# Patient Record
Sex: FEMALE | Race: WHITE | Employment: STUDENT | URBAN - METROPOLITAN AREA
[De-identification: names, ages, dates, MRNs, and addresses within clinical notes are randomized per-mention and may not be internally consistent; named-entity substitution may affect disease eponyms.]

---
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
## 2022-02-04 PROBLEM — N94.0 MITTELSCHMERZ: Status: ACTIVE | Noted: 2021-09-22

## 2022-02-04 PROBLEM — R10.31 RLQ ABDOMINAL PAIN: Status: ACTIVE | Noted: 2021-09-22

## 2022-06-25 ENCOUNTER — OFFICE VISIT (OUTPATIENT)
Dept: FAMILY MEDICINE CLINIC | Facility: CLINIC | Age: 22
End: 2022-06-25
Payer: COMMERCIAL

## 2022-06-25 VITALS
OXYGEN SATURATION: 98 % | HEIGHT: 62 IN | WEIGHT: 150 LBS | RESPIRATION RATE: 18 BRPM | HEART RATE: 111 BPM | SYSTOLIC BLOOD PRESSURE: 110 MMHG | TEMPERATURE: 100 F | BODY MASS INDEX: 27.6 KG/M2 | DIASTOLIC BLOOD PRESSURE: 64 MMHG

## 2022-06-25 DIAGNOSIS — R68.89 FLU-LIKE SYMPTOMS: ICD-10-CM

## 2022-06-25 DIAGNOSIS — R11.0 NAUSEA: ICD-10-CM

## 2022-06-25 DIAGNOSIS — J02.9 SORE THROAT: Primary | ICD-10-CM

## 2022-06-25 LAB
CONTROL LINE PRESENT WITH A CLEAR BACKGROUND (YES/NO): YES YES/NO
STREP GRP A CUL-SCR: NEGATIVE

## 2022-06-25 PROCEDURE — 3008F BODY MASS INDEX DOCD: CPT | Performed by: PHYSICIAN ASSISTANT

## 2022-06-25 PROCEDURE — 99213 OFFICE O/P EST LOW 20 MIN: CPT | Performed by: PHYSICIAN ASSISTANT

## 2022-06-25 PROCEDURE — 3074F SYST BP LT 130 MM HG: CPT | Performed by: PHYSICIAN ASSISTANT

## 2022-06-25 PROCEDURE — 87637 SARSCOV2&INF A&B&RSV AMP PRB: CPT | Performed by: PHYSICIAN ASSISTANT

## 2022-06-25 PROCEDURE — 3078F DIAST BP <80 MM HG: CPT | Performed by: PHYSICIAN ASSISTANT

## 2022-06-25 PROCEDURE — 87880 STREP A ASSAY W/OPTIC: CPT | Performed by: PHYSICIAN ASSISTANT

## 2022-06-25 RX ORDER — ONDANSETRON 4 MG/1
4 TABLET, ORALLY DISINTEGRATING ORAL EVERY 8 HOURS PRN
Qty: 15 TABLET | Refills: 0 | Status: SHIPPED | OUTPATIENT
Start: 2022-06-25

## 2022-06-26 LAB
FLUAV + FLUBV RNA SPEC NAA+PROBE: NEGATIVE
FLUAV + FLUBV RNA SPEC NAA+PROBE: NEGATIVE
RSV RNA SPEC NAA+PROBE: NEGATIVE
SARS-COV-2 RNA RESP QL NAA+PROBE: DETECTED

## 2022-08-28 ENCOUNTER — OFFICE VISIT (OUTPATIENT)
Dept: FAMILY MEDICINE CLINIC | Facility: CLINIC | Age: 22
End: 2022-08-28
Payer: COMMERCIAL

## 2022-08-28 VITALS
OXYGEN SATURATION: 98 % | DIASTOLIC BLOOD PRESSURE: 71 MMHG | HEIGHT: 62 IN | WEIGHT: 150 LBS | SYSTOLIC BLOOD PRESSURE: 115 MMHG | HEART RATE: 87 BPM | BODY MASS INDEX: 27.6 KG/M2 | TEMPERATURE: 98 F | RESPIRATION RATE: 18 BRPM

## 2022-08-28 DIAGNOSIS — J01.00 ACUTE NON-RECURRENT MAXILLARY SINUSITIS: Primary | ICD-10-CM

## 2022-08-28 PROCEDURE — 3008F BODY MASS INDEX DOCD: CPT | Performed by: NURSE PRACTITIONER

## 2022-08-28 PROCEDURE — 3078F DIAST BP <80 MM HG: CPT | Performed by: NURSE PRACTITIONER

## 2022-08-28 PROCEDURE — 99213 OFFICE O/P EST LOW 20 MIN: CPT | Performed by: NURSE PRACTITIONER

## 2022-08-28 PROCEDURE — 3074F SYST BP LT 130 MM HG: CPT | Performed by: NURSE PRACTITIONER

## 2022-08-28 RX ORDER — AZITHROMYCIN 250 MG/1
TABLET, FILM COATED ORAL
Qty: 6 TABLET | Refills: 0 | Status: SHIPPED | OUTPATIENT
Start: 2022-08-28 | End: 2022-09-02

## 2022-12-29 ENCOUNTER — OFFICE VISIT (OUTPATIENT)
Dept: FAMILY MEDICINE CLINIC | Facility: CLINIC | Age: 22
End: 2022-12-29
Payer: COMMERCIAL

## 2022-12-29 VITALS
HEART RATE: 71 BPM | SYSTOLIC BLOOD PRESSURE: 114 MMHG | TEMPERATURE: 98 F | DIASTOLIC BLOOD PRESSURE: 70 MMHG | HEIGHT: 62 IN | WEIGHT: 150 LBS | RESPIRATION RATE: 16 BRPM | OXYGEN SATURATION: 98 % | BODY MASS INDEX: 27.6 KG/M2

## 2022-12-29 DIAGNOSIS — H92.01 EAR PAIN, RIGHT: Primary | ICD-10-CM

## 2022-12-29 DIAGNOSIS — J06.9 UPPER RESPIRATORY TRACT INFECTION, UNSPECIFIED TYPE: ICD-10-CM

## 2022-12-29 PROCEDURE — 99213 OFFICE O/P EST LOW 20 MIN: CPT | Performed by: NURSE PRACTITIONER

## 2022-12-29 PROCEDURE — 3008F BODY MASS INDEX DOCD: CPT | Performed by: NURSE PRACTITIONER

## 2022-12-29 PROCEDURE — 3078F DIAST BP <80 MM HG: CPT | Performed by: NURSE PRACTITIONER

## 2022-12-29 PROCEDURE — 3074F SYST BP LT 130 MM HG: CPT | Performed by: NURSE PRACTITIONER

## 2022-12-29 RX ORDER — CEFDINIR 300 MG/1
300 CAPSULE ORAL 2 TIMES DAILY
Qty: 20 CAPSULE | Refills: 0 | Status: SHIPPED | OUTPATIENT
Start: 2022-12-29 | End: 2023-01-08

## 2022-12-29 NOTE — PATIENT INSTRUCTIONS
Start Sudafed 12 hour tablets twice daily for the next several days. If not helping ear pain or ear pain worsens may start Cefdinir twice daily for 10 days. Take with food. Rest, hydrate, use hot steam, tayo's vapor rub to chest, tylenol or Motrin as needed  Follow up with PCP if symptoms persist or seek emergency care if symptoms worsen.

## (undated) NOTE — ED AVS SNAPSHOT
[facility] Emergency Department    [place]  [address]    [address] [place] [address]    Phone:  [phone]    Fax:  [phone] [facility]   MRN: [id number]    Department:  [facility] Emergency Department   Date of Visit:  5/25/20 METAMUCIL Wafr   Quantity:  24 Wafer   Take 2 Wafers by mouth daily. Polyethylene Glycol 3350 Powd   Quantity:  510 g   Commonly known as:  MIRALAX   Take 17 g by mouth daily.             Where to Get Your Medications      You can get these medicatio receive this, we would really appreciate it if you could take the time to complete it. Thank you! You were examined and treated today on an urgent basis only. This was not a substitute for ongoing medical care.  Often, one Emergency Department visit d 4[address]  [facility] ([address]) [facility] [address] ([address]) [address]  [place] ([address]) 21  [address] ([address]) 972 abdominal pain since this morning with nausea. 25 [facility] (CPT=93975)+(EDW [id number]) (Final result) Result time:  05/25/17 14:42:51    Final result    Impression:    CONCLUSION:       1. Unremarkable uterus and ovaries.      2. No e COMPARISON:  None. INDICATIONS:  Right lower quadrant pain     TECHNIQUE:  A routine ultrasound of the appendix was performed. FINDINGS:  The appendix is not visualized.  Appendicitis cannot be excluded on the basis of this exam. Clinical correlatio

## (undated) NOTE — ED AVS SNAPSHOT
[facility] Emergency Department    [place]  [address]    [address] [place] [address]    Phone:  [phone]    Fax:  [phone]   MRN: [id number]    Department:  [facility] Emergency Department   Date of Visit:  5/25/20 IF THERE IS ANY CHANGE OR WORSENING OF YOUR CONDITION, CALL YOUR PRIMARY CARE PHYSICIAN AT ONCE OR RETURN IMMEDIATELY TO THE EMERGENCY DEPARTMENT.     If you have been prescribed any medication(s), please fill your prescription right away and begin taking t

## (undated) NOTE — ED AVS SNAPSHOT
Judith Motley   MRN: [id number]    Department:  [facility] Emergency Department   Date of Visit:  8/17/2019           Disclosure     Insurance plans vary and the physician(s) referred by the ER may not be covered by your plan.  Please contact your i tell this physician (or your personal doctor if your instructions are to return to your personal doctor) about any new or lasting problems. The primary care or specialist physician will see patients referred from the [facility] Emergency Department.  Kaitlin Fletcher

## (undated) NOTE — ED AVS SNAPSHOT
[facility] [address]    [address] [facility]    Phone:  [phone]    Fax:  [phone]   MRN: [id number]    Department:  [facility] [place] [facility] in [address] [place] [address],[address]   Date of Visit:  3/6/2017 [facility]  [address] [facility] [place], [address]  [phone] [facility]  [address]  [place], [address]  [phone] [facility]  [address] 600 [facility] Proc. Ismael Villa 1   [phone] re-interpreted by a radiologist.  If there is a significant change in your reading, you will be contacted. Please make sure we have your correct phone number before you leave. After you leave, you should follow the attached instructions.      I have read an and ask to get set up for an insurance coverage that is in-network with Trista Najera. [employer]     Sign up for [employer] access for your child.   [employer] access allows you to view health information for your child from their recent   visit, vi